# Patient Record
(demographics unavailable — no encounter records)

---

## 2024-12-11 NOTE — ASSESSMENT
[FreeTextEntry1] : 4 y/o male who is s/p BMT and adenoidectomy 5/21. currently has a cold He has some nasal congestion   Ear exam: tubes in place and patent without drainage  -Continue using saline mist  -Recommend nasal saline gel BID -F/u in 3-4 months for tube check and will plan for audio next visit since today he has URI

## 2024-12-11 NOTE — REASON FOR VISIT
[Subsequent Evaluation] : a subsequent evaluation for [Parent] : parent [FreeTextEntry2] : s/p BMT and adenoidectomy 5/21/24/ nasal  congestion

## 2024-12-11 NOTE — PHYSICAL EXAM
[Normal] : mucosa is normal [Midline] : trachea located in midline position [de-identified] : bilateral tubes in place, patent without any drainage  [de-identified] : significant dry crusting  [de-identified] : 1-2+

## 2024-12-11 NOTE — HISTORY OF PRESENT ILLNESS
[de-identified] : 6 y/o male who is s/p BMT and adenoidectomy 5/21. He is doing well. Today he has a little cold and has some congestion and sneezing. He has no change in his hearing. He has no ear pain or drainage.  [FreeTextEntry1] : He is here for f/u. His mother states he has a cold today. His ears have been doing well. He has not had any ear infections.  Pt denies any ear pain, drainage, tinnitus or hearing loss.

## 2025-04-04 NOTE — END OF VISIT
[FreeTextEntry3] : I personally saw and examined Mr. CRYSTAL ALEXANDRA in detail this visit today. I personally reviewed the HPI, PMH, FH, SH, ROS and medications/allergies. I have spoken to SUDHEER Li regarding the history and have personally determined the assessment and plan of care, and documented this myself. I was present and participated in all key portions of the encounter and all procedures noted above. I have made changes in the body of the note where appropriate.   Attesting Faculty: See Attending Signature Below

## 2025-04-04 NOTE — PHYSICAL EXAM
[de-identified] : bilateral tubes in place, patent without any drainage  [de-identified] : significant dry crusting  [de-identified] : 1-2+

## 2025-04-04 NOTE — ASSESSMENT
[FreeTextEntry1] : 4 y/o male who is s/p BMT and adenoidectomy 5/21, he has been doing well. He has been having intermittent nasal crusting and mucus in the front of the nose   Ear exam: tubes in place and patent without drainage  -Continue using saline mist for nasal crusting and mucus in front of the nose  -Recommend nasal saline gel BID -Postop Audio shows hearing WNL with patent tubes -F/u in 3-4 months for tube check

## 2025-04-04 NOTE — HISTORY OF PRESENT ILLNESS
[de-identified] : 4 y/o male who is s/p BMT and adenoidectomy 5/21/24. He is doing well. Today he has a little cold and has some congestion and sneezing. He has no change in his hearing. He has no ear pain or drainage.  [FreeTextEntry1] : He is here for f/u. He has been doing well.  He has not had any ear infections.  Pt denies any ear pain, drainage, tinnitus or hearing loss. His mother states he will get nasal crusting and mucus in the front of the nose. She uses the nasal saline spray when this occurs

## 2025-04-04 NOTE — PHYSICAL EXAM
[de-identified] : bilateral tubes in place, patent without any drainage  [de-identified] : significant dry crusting  [de-identified] : 1-2+

## 2025-04-04 NOTE — HISTORY OF PRESENT ILLNESS
[de-identified] : 4 y/o male who is s/p BMT and adenoidectomy 5/21/24. He is doing well. Today he has a little cold and has some congestion and sneezing. He has no change in his hearing. He has no ear pain or drainage.  [FreeTextEntry1] : He is here for f/u. He has been doing well.  He has not had any ear infections.  Pt denies any ear pain, drainage, tinnitus or hearing loss. His mother states he will get nasal crusting and mucus in the front of the nose. She uses the nasal saline spray when this occurs

## 2025-07-25 NOTE — ASSESSMENT
[FreeTextEntry1] : 6 y/o male who is s/p BMT and adenoidectomy 5/21, he has been doing well. He has been having intermittent nasal crusting and mucus in the front of the nose   Ear exam: tubes in place and patent without drainage  -Continue using saline mist for nasal crusting and mucus in front of the nose  -Recommend nasal saline gel BID -F/u in 3-4 months for tube check and plan to repeat audio once both tubes out

## 2025-07-25 NOTE — REASON FOR VISIT
[Subsequent Evaluation] : a subsequent evaluation for [FreeTextEntry2] : s/p BMT and adenoidectomy 5/21/24 nasal  congestion [Parent] : parent

## 2025-07-25 NOTE — PHYSICAL EXAM
[de-identified] : tube in right mid-canal [de-identified] : right TM intact without effusion; left TM with extruded tube sitting on it, no effusion [de-identified] : mild dry crusting  [Normal] : mucosa is normal [Midline] : trachea located in midline position [de-identified] : 1-2+

## 2025-07-25 NOTE — HISTORY OF PRESENT ILLNESS
[de-identified] : 6 y/o male who is s/p BMT and adenoidectomy 5/21/24.  [FreeTextEntry1] : He is here for f/u. He has been doing well.  He has not had any ear infections.  Pt denies any ear pain, drainage, tinnitus or hearing loss. His mother states he will get nasal crusting and mucus in the front of the nose. She uses the nasal saline spray and saline gel when this occurs and it is helping.